# Patient Record
Sex: FEMALE | Race: BLACK OR AFRICAN AMERICAN | Employment: FULL TIME | URBAN - METROPOLITAN AREA
[De-identification: names, ages, dates, MRNs, and addresses within clinical notes are randomized per-mention and may not be internally consistent; named-entity substitution may affect disease eponyms.]

---

## 2017-01-31 ENCOUNTER — HOSPITAL ENCOUNTER (EMERGENCY)
Facility: HOSPITAL | Age: 27
Discharge: HOME | End: 2017-01-31
Attending: EMERGENCY MEDICINE
Payer: MEDICAID

## 2017-01-31 VITALS
WEIGHT: 140 LBS | SYSTOLIC BLOOD PRESSURE: 119 MMHG | RESPIRATION RATE: 16 BRPM | OXYGEN SATURATION: 100 % | TEMPERATURE: 99 F | DIASTOLIC BLOOD PRESSURE: 75 MMHG | HEART RATE: 66 BPM | BODY MASS INDEX: 23.9 KG/M2 | HEIGHT: 64 IN

## 2017-01-31 DIAGNOSIS — B34.9 VIRAL ILLNESS: Primary | ICD-10-CM

## 2017-01-31 LAB — S PYO AG THROAT QL: NEGATIVE

## 2017-01-31 PROCEDURE — 87430 STREP A AG IA: CPT

## 2017-01-31 PROCEDURE — 99283 EMERGENCY DEPT VISIT LOW MDM: CPT

## 2017-02-01 NOTE — ED NOTES
Presents to ER for c/o throat \"irritation\" x12 days, yesterday she noted some pale discoloration to her tongue. States she experienced a headache and runny nose approx 2 weeks ago but that has since resolved.  Throat is not reddened and no exudate or swel

## 2017-02-01 NOTE — ED NOTES
Discharged home with instructions to follow-up with PMD this week. To return to ER for any new or worsening symptoms i.e. Nuchal rigidity, difficulty swallowing, or any other new or worsening symptoms. Ambulatory with steady gait to exit.

## 2017-02-01 NOTE — ED PROVIDER NOTES
Patient Seen in: [facility] AND [facility] Emergency Department    History   Patient presents with:  Sore Throat    Stated Complaint: SORE THROAT    HPI    51-year-old female without significant past medical history presents with complaints of unusual looking to auscultation  Cardiac: regular rate and rhythm  Musculoskeletal: neck is supple and non tender         Extremities have full range of motion and are non tender  Skin: no rashes or lesions    DIFFERENTIAL DIAGNOSIS: After history and physical exam different

## 2017-06-12 ENCOUNTER — HOSPITAL ENCOUNTER (EMERGENCY)
Facility: HOSPITAL | Age: 27
Discharge: HOME | End: 2017-06-12
Payer: MEDICAID

## 2017-06-12 ENCOUNTER — APPOINTMENT (OUTPATIENT)
Dept: ULTRASOUND IMAGING | Facility: HOSPITAL | Age: 27
End: 2017-06-12
Attending: NURSE PRACTITIONER
Payer: MEDICAID

## 2017-06-12 VITALS
HEIGHT: 65 IN | SYSTOLIC BLOOD PRESSURE: 110 MMHG | HEART RATE: 65 BPM | DIASTOLIC BLOOD PRESSURE: 57 MMHG | TEMPERATURE: 98 F | OXYGEN SATURATION: 100 % | RESPIRATION RATE: 18 BRPM | WEIGHT: 140 LBS | BODY MASS INDEX: 23.32 KG/M2

## 2017-06-12 DIAGNOSIS — Z3A.01 LESS THAN 8 WEEKS GESTATION OF PREGNANCY: Primary | ICD-10-CM

## 2017-06-12 DIAGNOSIS — R11.2 NON-INTRACTABLE VOMITING WITH NAUSEA, UNSPECIFIED VOMITING TYPE: ICD-10-CM

## 2017-06-12 DIAGNOSIS — O41.8X10 SUBCHORIONIC BLEED, FIRST TRIMESTER, NOT APPLICABLE OR UNSPECIFIED FETUS: ICD-10-CM

## 2017-06-12 PROCEDURE — 86900 BLOOD TYPING SEROLOGIC ABO: CPT | Performed by: NURSE PRACTITIONER

## 2017-06-12 PROCEDURE — 96374 THER/PROPH/DIAG INJ IV PUSH: CPT

## 2017-06-12 PROCEDURE — 99285 EMERGENCY DEPT VISIT HI MDM: CPT

## 2017-06-12 PROCEDURE — 96361 HYDRATE IV INFUSION ADD-ON: CPT

## 2017-06-12 PROCEDURE — 76817 TRANSVAGINAL US OBSTETRIC: CPT | Performed by: NURSE PRACTITIONER

## 2017-06-12 PROCEDURE — 76801 OB US < 14 WKS SINGLE FETUS: CPT | Performed by: NURSE PRACTITIONER

## 2017-06-12 PROCEDURE — 81025 URINE PREGNANCY TEST: CPT

## 2017-06-12 PROCEDURE — 85025 COMPLETE CBC W/AUTO DIFF WBC: CPT | Performed by: NURSE PRACTITIONER

## 2017-06-12 PROCEDURE — 81001 URINALYSIS AUTO W/SCOPE: CPT | Performed by: NURSE PRACTITIONER

## 2017-06-12 PROCEDURE — 84702 CHORIONIC GONADOTROPIN TEST: CPT | Performed by: NURSE PRACTITIONER

## 2017-06-12 PROCEDURE — 86901 BLOOD TYPING SEROLOGIC RH(D): CPT | Performed by: NURSE PRACTITIONER

## 2017-06-12 PROCEDURE — 80048 BASIC METABOLIC PNL TOTAL CA: CPT | Performed by: NURSE PRACTITIONER

## 2017-06-12 RX ORDER — METOCLOPRAMIDE 10 MG/1
10 TABLET ORAL EVERY 6 HOURS PRN
Qty: 20 TABLET | Refills: 0 | Status: SHIPPED | OUTPATIENT
Start: 2017-06-12 | End: 2017-07-12

## 2017-06-12 RX ORDER — FENUGREEK SEED/BL.THISTLE/ANIS 340 MG
1 CAPSULE ORAL DAILY
Qty: 30 EACH | Refills: 0 | Status: SHIPPED | OUTPATIENT
Start: 2017-06-12

## 2017-06-12 RX ORDER — ONDANSETRON 2 MG/ML
4 INJECTION INTRAMUSCULAR; INTRAVENOUS ONCE
Status: COMPLETED | OUTPATIENT
Start: 2017-06-12 | End: 2017-06-12

## 2017-06-12 NOTE — ED NOTES
Discharge instructions given and reviewed, pt told to follow up with ob/gyne, push clear liquids. Meds as told side effects discussed. Return with any new or worsening symptoms. Verbalized knowledge. Tolerated 12oz water prior to discharge.

## 2017-06-12 NOTE — ED PROVIDER NOTES
Patient Seen in: [facility] AND [facility] Emergency Department    History   Patient presents with:  Abdomen/Flank Pain (GI/)    Stated Complaint: lower abdominal pain    HPI    Patient presents into the emergency room for evaluation of abdominal pain.   Javier as otherwise stated in HPI.     Physical Exam       ED Triage Vitals   BP 06/12/17 1225 120/60 mmHg   Pulse 06/12/17 1225 81   Resp 06/12/17 1225 20   Temp 06/12/17 1225 97.6 °F (36.4 °C)   Temp src 06/12/17 1225 Oral   SpO2 06/12/17 1225 100 %   O2 Device [facility] POCT PREGNANCY URINE - Abnormal; Notable for the following:     POCT Urine Pregnancy Positive (*)     All other components within normal limits   CBC WITH DIFFERENTIAL WITH PLATELET    Narrative:      The following orders were created for panel order Result Value Ref Range   Hcg Quantitative 377956.0 miu/mL   -BASIC METABOLIC PANEL (8)   Collection Time: 06/12/17  1:01 PM   Result Value Ref Range   Glucose 123 (H) 70-99 mg/dL   Sodium 136 136-144 mmol/L   Potassium 3.8 3.3-5.1 mmol/L   Chloride 105 9 days        Medications Prescribed:  Current Discharge Medication List    START taking these medications    Bfygav-Kmqrxjcxo-Oyqd-Fish Oil (PRENATAL + COMPLETE MULTI) 0.267 & 373 MG Oral Therapy Pack  Take 1 tablet by mouth daily.   Qty: 30 each Refills: 0

## (undated) NOTE — ED AVS SNAPSHOT
[facility] Emergency Department    [address]     [place] [address]    Phone:  [phone]    Fax:  [phone]           Ghislaine Leavitt   MRN: [id number]    Department:  [facility] Emergency Department   Date of Visit:  6/12/ Bring a paper prescription for each of these medications    - Metoclopramide HCl 10 MG Tabs  - PRENATAL + COMPLETE MULTI 0.267 & 373 MG Thpk              Discharge Instructions       No intercourse  followup with gynecology to begin prenatal care  Return a physician, you may call the [place] [address] Physician Referral and Class Registration line at [phone] or find a doctor online by visiting [email or web address].    IF THERE IS ANY CHANGE OR WORSENING OF YOUR CONDITION, [facility] PRIMARY CHERY Valente - If you are a smoker or have smoked in the last 12 months, we encourage you to explore options for quitting.     - If you have concerns related to behavioral health issues or thoughts of harming yourself, contact [facility] a

## (undated) NOTE — ED AVS SNAPSHOT
[facility] Emergency Department    [address]     [place] [address]    Phone:  [phone]    Fax:  [phone]           Jennifer Sunshine   MRN: [id number]    Department:  [facility] Emergency Department   Date of Visit:  1/31/ our [address] at [phone]. Your Emergency Department team is here to serve you. You are our top priority. You were examined and treated today on an urgent basis only. This was not a substitute for ongoing medical care.  Often, one Emergency Dep pertaining to these instructions have been answered in a satisfactory manner. 24-Hour Pharmacies        Pharmacy Address Phone Number   Walker [address] [facility] ([address]) [facility] Your unique [facility] Access Code: [email or web address]  Expires: 4/1/2017 10:09 PM    If you have questions, you can call [phone] to talk to our [facility] Staff. Remember, [employer] is NOT to be used for urgent needs. For medical emergencies, dial 911.

## (undated) NOTE — ED AVS SNAPSHOT
[facility] Emergency Department    [address]     [place] [address]    Phone:  [phone]    Fax:  [phone]           Ghislaine Tree   MRN: [id number]    Department:  [facility] Emergency Department   Date of Visit:  1/31/ and Class Registration line at [phone] or find a doctor online by visiting [email or web address].    IF THERE IS ANY CHANGE OR WORSENING OF YOUR CONDITION, CALL YOUR PRIMARY CARE PHYSICIAN AT ONCE OR RETURN IMMEDIATELY TO [address] [place] [address].     If

## (undated) NOTE — ED AVS SNAPSHOT
[facility] Emergency Department    [address]     [place] [address]    Phone:  [phone]    Fax:  [phone]           Sixto Mateo   MRN: [id number]    Department:  [facility] Emergency Department   Date of Visit:  6/12/ and Class Registration line at [phone] or find a doctor online by visiting [email or web address].    IF THERE IS ANY CHANGE OR WORSENING OF YOUR CONDITION, CALL YOUR PRIMARY CARE PHYSICIAN AT ONCE OR RETURN IMMEDIATELY TO [address] [place] [address].     If